# Patient Record
Sex: MALE | Race: WHITE | URBAN - METROPOLITAN AREA
[De-identification: names, ages, dates, MRNs, and addresses within clinical notes are randomized per-mention and may not be internally consistent; named-entity substitution may affect disease eponyms.]

---

## 2016-09-07 LAB — PHQ9 SCORE: 24

## 2016-09-19 LAB — PHQ9 SCORE: 13

## 2016-09-27 LAB — PHQ9 SCORE: 17

## 2016-10-03 LAB — PHQ9 SCORE: 13

## 2016-10-04 LAB — PHQ9 SCORE: 13

## 2016-10-11 LAB — PHQ9 SCORE: 11

## 2017-01-04 ENCOUNTER — TRANSFERRED RECORDS (OUTPATIENT)
Dept: HEALTH INFORMATION MANAGEMENT | Facility: CLINIC | Age: 27
End: 2017-01-04

## 2017-01-18 ENCOUNTER — TELEPHONE (OUTPATIENT)
Dept: FAMILY MEDICINE | Facility: CLINIC | Age: 27
End: 2017-01-18

## 2017-01-18 NOTE — TELEPHONE ENCOUNTER
Patient is due for a PHQ-9.  Index start date:12/25/16  Index end date:2/25/17    Will forward to schedulers to schedule patient for depression OV - please check to see if patient is being seen in [place], or if he has a primary elsewhere.  Please leave open when scheduled.  Thanks!  Mala Morton RN

## 2017-01-18 NOTE — TELEPHONE ENCOUNTER
Tried to reach out to patient and was unable to leave a message due to no voicemail or mailbox was full. Will try again at a later time.    Thank you,  Angelic Angulo   for [facility]

## 2017-02-02 ENCOUNTER — TRANSFERRED RECORDS (OUTPATIENT)
Dept: HEALTH INFORMATION MANAGEMENT | Facility: CLINIC | Age: 27
End: 2017-02-02

## 2017-02-02 LAB — PHQ9 SCORE: 11

## 2017-03-10 ENCOUNTER — FCC EXTENDED DOCUMENTATION (OUTPATIENT)
Dept: PSYCHOLOGY | Facility: CLINIC | Age: 27
End: 2017-03-10

## 2017-03-10 NOTE — PROGRESS NOTES
"                      Discharge Summary  Single Session    Client Name: Lorenzo Alexander MRN#: [id number] YOB: 1990      Intake / Discharge Date: Intake:  3/9/16   / Discharge: 3/10/17      DSM5 Diagnoses: (Sustained by DSM5 Criteria Listed Above)  Diagnoses: Adjustment Disorders 309.28 (F43.23) With mixed anxiety and depressed mood  Psychosocial & Contextual Factors: marital strain, history of trauma          Presenting Concern:  At time of intake, client presented with the following concerns: Client reports his \"marriage is falling apart.\" He and current wife reconnected in July 2015 after several years of no contact. They began dating in October and  in November. He describes the marriage as impulsive. He reports feeling obsessed about his wife's communication with other men. She exchanges Facebook message with random men who tell her she is beautiful and ask to take her out. According to client, his wife doesn't say no to these people. 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 He is a  and she is a . \"She paints what I build.\" There is another male  at the company who, according to client, flirts with his wife all day. He watches them laugh, giggle, and talk in the back area. He has difficulty focusing on work and makes mistakes because he is consumed with the emotions and thoughts about his wife talking with this man. Client has brought these concerns up to his wife and she reportedly states she tells him she doesn't care and he is overreacting. Client believes his concerns are valid and would like for wife to make changes.      Aside from making mistakes at work and having difficulty focusing on work tasks, he is not sleeping much and has no appetite. He reports being consumed with obsessive thoughts about what his wife is doing.       Reason for Discharge:  Client did not return      Disposition at " Time of Last Encounter:   Comments:   Client appears disheveled and presents as irritable.     Risk Management:   Client denies current fears or concerns for personal safety.   Client reports the following current or recent suicidal ideation or behaviors: passive thoughts about suicide. No plan, no intent to die. States he likes life and wants  to keep living. Sometimes the thoughts happen, though.   Client denies current or recent homicidal ideation or behaviors.   Client denies current or recent self injurious behavior or ideation.   Client denies other safety concerns.   A safety and risk management plan has been developed including: Client consented to co-developed safety plan, which includes using deep breathing, imagining  thoughts like waves that come and go, calling therapist, calling [facility], presenting to ED as necessary.  Client reports there are no firearms in the house.      Referred To:  Client is welcome to return to [facility] for therapy in the future and can contact [facility] Intake to schedule ([phone]).          DANISH Cadet   3/10/2017

## 2017-03-13 LAB — PHQ9 SCORE: 8

## 2017-08-24 ENCOUNTER — TRANSFERRED RECORDS (OUTPATIENT)
Dept: HEALTH INFORMATION MANAGEMENT | Facility: CLINIC | Age: 27
End: 2017-08-24

## 2017-08-24 LAB — PHQ9 SCORE: 13

## 2017-09-11 ENCOUNTER — TRANSFERRED RECORDS (OUTPATIENT)
Dept: HEALTH INFORMATION MANAGEMENT | Facility: CLINIC | Age: 27
End: 2017-09-11